# Patient Record
Sex: FEMALE | Race: WHITE
[De-identification: names, ages, dates, MRNs, and addresses within clinical notes are randomized per-mention and may not be internally consistent; named-entity substitution may affect disease eponyms.]

---

## 2017-11-22 ENCOUNTER — HOSPITAL ENCOUNTER (OUTPATIENT)
Dept: HOSPITAL 63 - MAMMO | Age: 67
Discharge: HOME | End: 2017-11-22
Attending: FAMILY MEDICINE
Payer: COMMERCIAL

## 2017-11-22 DIAGNOSIS — Z12.31: Primary | ICD-10-CM

## 2017-11-22 NOTE — RAD
DATE: 11/22/2017.



EXAM: DIGITAL SCREEN BILAT W/CAD.



HISTORY: Routine mammographic screening.



COMPARISON: 11/22/2016.



This study was interpreted with the benefit of Computerized Aided Detection

(CAD).



FINDINGS:



The breast parenchyma is heterogeneously dense, which could reduce sensitivity

of mammography.  Breast parenchyma level C..



There are no suspicious masses, microcalcifications or architectural

distortion.  Scattered coarse calcifications are benign.



BI-RADS CATEGORY: 2 BENIGN FINDING(S).



RECOMMENDED FOLLOW-UP: 12M 12 MONTH FOLLOW-UP.



PQRS compliance statement: Patient information was entered into a reminder

system with a target due date 11/22/2018 for the next mammogram.



Mammography is a sensitive method for finding small breast cancers, but it

does not detect them all and is not a substitute for careful clinical

examination.  A negative mammogram does not negate a clinically suspicious

finding and should not result in delay in biopsying a clinically suspicious

abnormality.



"Our facility is accredited by the [employer] Mammography

Program."

## 2018-11-28 ENCOUNTER — HOSPITAL ENCOUNTER (OUTPATIENT)
Dept: HOSPITAL 63 - MAMMO | Age: 68
Discharge: HOME | End: 2018-11-28
Attending: FAMILY MEDICINE
Payer: COMMERCIAL

## 2018-11-28 DIAGNOSIS — Z12.31: Primary | ICD-10-CM

## 2018-11-28 PROCEDURE — 77067 SCR MAMMO BI INCL CAD: CPT

## 2018-11-28 PROCEDURE — 77063 BREAST TOMOSYNTHESIS BI: CPT

## 2018-11-28 NOTE — RAD
DATE: 11/28/2018



EXAM: MAMMO CELESTINA SCREENING BILATERAL



HISTORY: Routine screening



COMPARISON: 11/22/2017



This study was interpreted with the benefit of Computerized Aided Detection

(CAD).



Breast Density: HETERO The breast parenchyma is heterogenously dense, which

could reduce sensitivity of mammography. Breast parenchyma level C.



FINDINGS: 2-D and 3-D tomosynthesis imaging was performed in CC and MLO

projections.  Benign-appearing lymph node type densities are present in both

axillary regions.  No spiculated mass or architectural distortion is evident. 

There is a 6 mm smooth nodule in the medial aspect of the left breast at

approximately the 9-10:00 location best seen on CC tomosynthesis images

numbered 30.  This was not evident on the previous study, however, that could

be due to technical factors.  No other new or enlarging breast densities are

seen.  Benign type calcifications are present.  No suspicious

microcalcifications have developed.  



IMPRESSION: Small medial left breast nodule as described above.  Sonographic

evaluation is suggested.





BI-RADS CATEGORY: 0 INCOMPLETE: NEEDS ADDITIONAL IMAGING EVALUATION AND/OR

PRIOR MAMMOGRAMS FOR COMPARISON.



RECOMMENDED FOLLOW-UP: ADD ADDITIONAL IMAGING



PQRS compliance statement: Patient information was entered into a reminder

system with a target due date     for the next mammogram.



Mammography is a sensitive method for finding small breast cancers, but it

does not detect them all and is not a substitute for careful clinical

examination.  A negative mammogram does not negate a clinically suspicious

finding and should not result in delay in biopsying a clinically suspicious

abnormality.



"Our facility is accredited by the [employer] Mammography

Program."

## 2018-12-05 ENCOUNTER — HOSPITAL ENCOUNTER (OUTPATIENT)
Dept: HOSPITAL 63 - US | Age: 68
Discharge: HOME | End: 2018-12-05
Attending: FAMILY MEDICINE
Payer: COMMERCIAL

## 2018-12-05 DIAGNOSIS — R92.8: Primary | ICD-10-CM

## 2018-12-05 PROCEDURE — 76641 ULTRASOUND BREAST COMPLETE: CPT

## 2018-12-05 NOTE — RAD
Left breast ultrasound, 12/5/2018:



History: Abnormal mammogram



A targeted ultrasound exam the left breast was performed at the 9-10:00

location.  At the 10:00 location approximate 7 cm in the nipple there is a

small hypoechoic nodule.  Its margins are smooth.  It measures 6 x 4 x 6 mm. 

It is wider than tall.  Low level internal echoes are present.  There is no

definite posterior acoustic enhancement or shadowing.  This is probably a

complicated cyst or fibroadenoma.  A circumscribed malignancy is much less

likely.  It appears to correspond to the mammographic abnormality.



Incidental note is made of multiple dilated ducts in the retroareolar region.



IMPRESSION: Probably benign small left breast nodule as described above. 

Follow-up left breast ultrasound in 6 months and bilateral mammography at one

year is suggested.





BI-RADS 3-probably benign findings.

## 2019-06-03 ENCOUNTER — HOSPITAL ENCOUNTER (OUTPATIENT)
Dept: HOSPITAL 63 - US | Age: 69
Discharge: HOME | End: 2019-06-03
Attending: FAMILY MEDICINE
Payer: COMMERCIAL

## 2019-06-03 DIAGNOSIS — N63.22: Primary | ICD-10-CM

## 2019-06-03 PROCEDURE — 76641 ULTRASOUND BREAST COMPLETE: CPT

## 2019-06-03 NOTE — RAD
EXAM: Left breast ultrasound.



HISTORY: Six-month follow-up left breast nodule.



COMPARISON: 11/28/2018, 12/5/2018.



FINDINGS: Sonography of the left medial breast was performed at the site of

prior concern at the 10:00 position 7 cm from the nipple. This again

demonstrates a hypoechoic nodule measuring 6 x 4 mm. There is some internal

perfusion on Doppler.

One of its margins appears angulated and this remains indeterminate.



The patient reports chronic nipple retraction. In the retroareolar region, no

abnormality is identified.



IMPRESSION:

1. BI-RADS Category 4: Suspicious abnormality-biopsy is suggested.

2. Recommend ultrasound-guided biopsy of a 6 mm nodule at the left 10:00

position 7 cm from the nipple.

## 2019-06-19 ENCOUNTER — HOSPITAL ENCOUNTER (OUTPATIENT)
Dept: HOSPITAL 61 - US | Age: 69
Discharge: HOME | End: 2019-06-19
Payer: COMMERCIAL

## 2019-06-19 DIAGNOSIS — N60.32: Primary | ICD-10-CM

## 2019-06-19 PROCEDURE — 88305 TISSUE EXAM BY PATHOLOGIST: CPT

## 2019-06-19 PROCEDURE — C1713 ANCHOR/SCREW BN/BN,TIS/BN: HCPCS

## 2019-06-19 PROCEDURE — 19083 BX BREAST 1ST LESION US IMAG: CPT

## 2019-06-19 PROCEDURE — 76942 ECHO GUIDE FOR BIOPSY: CPT

## 2019-06-19 PROCEDURE — 77065 DX MAMMO INCL CAD UNI: CPT

## 2019-06-20 NOTE — PATHOLOGY
[facility] Accession Number: [id number]

.                                                                01

Material submitted:                                        .

breast - LEFT BREAST MASS, 10:00, 7CMFN. Modifiers: left, 10:00

.                                                                01

Clinical history:                                          .

Left breast mass

.                                                                02

**********************************************************************

Diagnosis:

Breast tissue, left breast mass 10:00 needle biopsies:

.

 Fibrocystic changes with the following components:

 - Cystic change.

 - Apocrine metaplasia, focally papillary.

 - Mild duct ectasia, focal.

 - Stromal fibrosis.

LBQ/06/20/2019

**********************************************************************

.                                                                02

Comment:

There is no evidence of malignancy.  Please correlate with mammographic

findings.

([facility]db; 6/20/2019)

.                                                                02

Electronically signed:                                     .

Jesus Faria MD, Pathologist

NPI- [id number]

.                                                                01

Gross description:                                         .

The specimen is received in formalin, labeled "Taylor Nice, left

breast mass, 10:00, 7 cm from nipple (6 mm)", are few fibrofatty cores and

its fragments measuring 1.2 x 0.7 x 0.4 cm in aggregate.  The specimen is

entirely submitted in A1-A3.

Specimen excised at: 1000 on 06/19/19, placed in formalin at: 1000 on

06/16/19, formalin exposure: Approximately 13 hours and 40 minutes.

([facility]; 6/19/2019)

SHS/SHS

.                                                                02

Pathologist provided ICD-10:

N60.12, N60.42, N60.32

.                                                                02

CPT                                                        .

312173

Specimen Comment: A courtesy copy of this report has been sent to

Specimen Comment: [phone], [phone].

Specimen Comment: Report sent to Dr.MORITZ / DR BLACK

***Performed at:  01

   [facility] [place] [id number], [place], [place]  [id number]

   MD Paco Richards MD Phone:  [phone]

***Performed at:  02

   [facility]

   [address], [place]  [id number]

   MD Jesus Faria MD Phone:  [phone]

## 2019-06-26 NOTE — RAD
Ultrasound-guided left breast biopsy, 6/19/2019:



HISTORY: Suspicious left breast nodule



Previous studies demonstrated a suspicious nodule at the 10:00 location

approximately 7 cm from the nipple.  Under local anesthesia, aseptic

conditions and sonographic guidance the KartRocket biopsy instrument was

passed into the posterior aspect of this lesion via an inferior medial

approach.  Multiple 12-gauge vacuum-assisted core samples were then obtained. 

The biopsy marker was deposited the biopsy site.  The biopsy instrument was

then removed and hemostasis obtained.  Two-view postprocedural digital

mammograms were then obtained to document position of the biopsy marker.  The

biopsied nodule is partially obscured by postbiopsy change The patient

tolerated the procedure well and left the department in good condition.



The subsequent pathology report indicated the presence of fibrocystic change

with apocrine metaplasia and no evidence of malignancy.  This is considered to

be a concordant finding.  Follow-up left mammography in 6 months and bilateral

mammography at one year is suggested.

## 2020-12-17 ENCOUNTER — HOSPITAL ENCOUNTER (OUTPATIENT)
Dept: HOSPITAL 63 - MAMMO | Age: 70
End: 2020-12-17
Attending: FAMILY MEDICINE
Payer: MEDICARE

## 2020-12-17 DIAGNOSIS — Z12.31: Primary | ICD-10-CM

## 2020-12-17 PROCEDURE — 77067 SCR MAMMO BI INCL CAD: CPT

## 2020-12-17 PROCEDURE — 77063 BREAST TOMOSYNTHESIS BI: CPT

## 2020-12-18 NOTE — RAD
Examination: MG BILAT SCREEN+CELESTINA



History: Reason: SCREENING MAMMOGRAM 3D / Spl. Instructions:  / History: 



Comparison/Correlation: 11/28/2018, 11/22/2017, 11/22/2016



Technique:  MLO and CC digital tomosynthesis (3D) images obtained. Radiologist reviewed these images 
on dedicated workstation.



Findings: 

Breast Tissue Density C : The breasts are heterogeneously dense, which may obscure small masses.



There are no dominant masses, suspicious microcalcifications, or architectural distortion.



IMPRESSION:

No mammographic evidence of malignancy. Recommend routine screening.



BI-RADS category 1:  Negative.



The images were reviewed with computer-aided detection.



Patient information is entered into reminder system with a target due date for the next screening rayna
mogram.



Mammography is the most sensitive method for finding small breast cancers, but it does not detect the
m all and is not a substitute for careful clinical examination. A negative mammogram does not negate 
a clinically suspicious finding and should not result in delay in biopsying a clinically suspicious a
bnormality.



 "Our facility is accredited by the [employer] Mammography Program."



Electronically signed by: Dex Newell MD (12/18/2020 8:03 PM) [email or web address]